# Patient Record
(demographics unavailable — no encounter records)

---

## 2024-12-11 NOTE — HISTORY OF PRESENT ILLNESS
[de-identified] : 16 year old female with malnutrition.   Patient says she's been eating her usual meals but has been taking more time which worried her doctor so told her to come back. Was following with PMD monthly.  Doesn't feel ED thoughts are as bad as before but does note it's taking longer to eat. Seeing therapist weekly for ED and also has non-ED therapist weekly. No longer on zyprexa. Didn't feel it was necessary.  Periods had been regular but last one was in October. Last SA in April 2024. Has had negative STI testing since then.  Notes some dysuria but no hematuria or frequency. Not present today. Says symptoms only occur on weekends as feels she drinks less water and uses bathroom less often. No external vaginal pain or discharge.  [de-identified] : B: 2 fried eggs, sally, plantains L: cheese sticks, fries from school lunch D: protein smoothie, rice, beans [de-identified] : Oct 2024

## 2024-12-11 NOTE — ASSESSMENT
[FreeTextEntry1] : 15 year old female with malnutrition s/p admission to [facility] [place] and [place]. Goal  weight ~ 120 pounds. Has been having a harder time with meals more recently but feels she can add to current diet and work towards goal of 3 meals and 1-2 snacks. Discussed will check hormones if no period within 3 months. No dysuria today but noted in past few days. Normal abd exam so will send UA and culture to see if UTI or if related to decreased fluid/voiding. Continue therapy. RTC 1 week.

## 2024-12-11 NOTE — PHYSICAL EXAM
[Normal] : abdomen normal bowel sounds, soft, non-tender, non distended and no hepatosplenomegaly [de-identified] : no LLE [de-identified] : Neuro: grossly intact

## 2024-12-11 NOTE — HISTORY OF PRESENT ILLNESS
[de-identified] : 16 year old female with malnutrition.   Patient says she's been eating her usual meals but has been taking more time which worried her doctor so told her to come back. Was following with PMD monthly.  Doesn't feel ED thoughts are as bad as before but does note it's taking longer to eat. Seeing therapist weekly for ED and also has non-ED therapist weekly. No longer on zyprexa. Didn't feel it was necessary.  Periods had been regular but last one was in October. Last SA in April 2024. Has had negative STI testing since then.  Notes some dysuria but no hematuria or frequency. Not present today. Says symptoms only occur on weekends as feels she drinks less water and uses bathroom less often. No external vaginal pain or discharge.  [de-identified] : B: 2 fried eggs, sally, plantains L: cheese sticks, fries from school lunch D: protein smoothie, rice, beans [de-identified] : Oct 2024

## 2024-12-11 NOTE — PHYSICAL EXAM
[Normal] : abdomen normal bowel sounds, soft, non-tender, non distended and no hepatosplenomegaly [de-identified] : no LLE [de-identified] : Neuro: grossly intact

## 2024-12-11 NOTE — ASSESSMENT
[FreeTextEntry1] : 15 year old female with malnutrition [place] [facility] [place]. Goal  weight ~ 120 pounds. Has been having a harder time with meals more recently but feels she can add to current diet and work towards goal of 3 meals and 1-2 snacks. Discussed will check hormones if no period within 3 months. No dysuria today but noted in past few days. Normal abd exam so will send UA and culture to see if UTI or if related to decreased fluid/voiding. Continue therapy. RTC 1 week.

## 2024-12-18 NOTE — PHYSICAL EXAM
[Normal] : abdomen normal bowel sounds, soft, non-tender, non distended and no hepatosplenomegaly [de-identified] : no LLE [de-identified] : Neuro: grossly intact

## 2024-12-18 NOTE — HISTORY OF PRESENT ILLNESS
[de-identified] : 12/12/24 [de-identified] : 16 year old female with malnutrition for f/u.  Had period last week after two months. Mom was very relieved. Patient felt ok with getting it because knows it's normal and healthy.  No vaginal itching or burning. Still notes on weekends that sometimes she has urinary symptoms noted at last visit but does not have any symptoms on school days. None today. Reviewed normal urine culture. Feels eating is going ok. Having what mom gives her and eating school lunch. Having breakfast daily. Says ED thoughts aren't as strong.  Seeing therapist weekly (eating disorder therapist and general).

## 2024-12-18 NOTE — ASSESSMENT
[FreeTextEntry1] : 15 year old female with malnutrition [place] [facility] [place] Goal  weight ~ 120 pounds. Better eating this week. To continue consistency. Had period last week so will continue to follow. Continue therapy. Therapist updated. To see PMD for weight check in next two weeks and to follow-up with me in 3 weeks.

## 2025-03-31 NOTE — ASSESSMENT
[FreeTextEntry1] : 16 year old female with malnutrition s/p admission to [facility] [place]. Goal  weight ~ 120 pounds. Has gained since last appt. To work on shortening time it takes to eat to 50 minutes. Now with regular periods x 4 months.  Continue therapy. Therapist updated. RTC 1 month.

## 2025-03-31 NOTE — HISTORY OF PRESENT ILLNESS
[de-identified] : 16 year old female with malnutrition for f/u.  Mom concerned that patient still takes 1 hour to eat but does complete her meals. Has 3 meals per day and a cup of smoothie with lunch and dinner.  Seeing ED therapist and regular therapist weekly. Patient feels her ED isn't too bad.  Has had period for 4 months in a row.  [de-identified] : B: green plantain, 2 fried eggs, oatmeal L: chicken, rice; smoothie with fruit, milk, sugar D: chicken, rice; smoothie  [de-identified] : walks for 90 mins on Saturday and Sunday--walks in her neighborhood at a normal pace [de-identified] : 3/4/35

## 2025-03-31 NOTE — PHYSICAL EXAM
[Normal] : abdomen normal bowel sounds, soft, non-tender, non distended and no hepatosplenomegaly [de-identified] : no LLE [de-identified] : Neuro: grossly intact

## 2025-06-02 NOTE — ASSESSMENT
[FreeTextEntry1] : 16 year old female with malnutrition [place] [facility] [place] Goal  weight ~ 120 pounds. Has gained since last appt. Regular periods. Eating in a shorter amount of time.  Continue therapy. Therapist updated. RTC 1-2 months.

## 2025-06-02 NOTE — PHYSICAL EXAM
[Normal] : abdomen normal bowel sounds, soft, non-tender, non distended and no hepatosplenomegaly [de-identified] : no LLE [de-identified] : Neuro: grossly intact

## 2025-06-02 NOTE — HISTORY OF PRESENT ILLNESS
[de-identified] : 16 year old female with malnutrition for f/u.  Doing ok with her eating. Has been eating in a shorter amount of time as she started working and has to get to work on time. Sees general therapist weekly and ED therapist weekly.  Period regular.  Says doesn't really have ED thoughts.  [de-identified] : B: turkey ontiveros, mashed plantains with butter, 2 fried eggs, oatmeal L: sandwich with turkey, cheese, lettuce; strawberry smoothie S; smoothie D: vegetable soup, rice, meat [de-identified] : 4/28/25

## 2025-06-02 NOTE — HISTORY OF PRESENT ILLNESS
[de-identified] : 16 year old female with malnutrition for f/u.  Doing ok with her eating. Has been eating in a shorter amount of time as she started working and has to get to work on time. Sees general therapist weekly and ED therapist weekly.  Period regular.  Says doesn't really have ED thoughts.  [de-identified] : B: turkey ontiveros, mashed plantains with butter, 2 fried eggs, oatmeal L: sandwich with turkey, cheese, lettuce; strawberry smoothie S; smoothie D: vegetable soup, rice, meat [de-identified] : 4/28/25

## 2025-06-02 NOTE — ASSESSMENT
[FreeTextEntry1] : 16 year old female with malnutrition [place] [facility] [place] Goal  weight ~ 120 pounds. Has gained since last appt. Regular periods. Eating in a shorter amount of time.  Continue therapy. Therapist updated. RTC 1-2 months.

## 2025-06-02 NOTE — PHYSICAL EXAM
[Normal] : abdomen normal bowel sounds, soft, non-tender, non distended and no hepatosplenomegaly [de-identified] : no LLE [de-identified] : Neuro: grossly intact